# Patient Record
Sex: MALE
[De-identification: names, ages, dates, MRNs, and addresses within clinical notes are randomized per-mention and may not be internally consistent; named-entity substitution may affect disease eponyms.]

---

## 2024-02-09 ENCOUNTER — APPOINTMENT (OUTPATIENT)
Dept: ORTHOPEDIC SURGERY | Facility: CLINIC | Age: 39
End: 2024-02-09
Payer: COMMERCIAL

## 2024-02-09 VITALS — WEIGHT: 210 LBS | BODY MASS INDEX: 32.96 KG/M2 | HEIGHT: 67 IN

## 2024-02-09 DIAGNOSIS — Z78.9 OTHER SPECIFIED HEALTH STATUS: ICD-10-CM

## 2024-02-09 DIAGNOSIS — Z00.00 ENCOUNTER FOR GENERAL ADULT MEDICAL EXAMINATION W/OUT ABNORMAL FINDINGS: ICD-10-CM

## 2024-02-09 PROCEDURE — 73030 X-RAY EXAM OF SHOULDER: CPT | Mod: RT

## 2024-02-09 PROCEDURE — 99204 OFFICE O/P NEW MOD 45 MIN: CPT

## 2024-02-14 ENCOUNTER — APPOINTMENT (OUTPATIENT)
Dept: MRI IMAGING | Facility: CLINIC | Age: 39
End: 2024-02-14
Payer: COMMERCIAL

## 2024-02-14 PROCEDURE — 73221 MRI JOINT UPR EXTREM W/O DYE: CPT | Mod: RT

## 2024-02-23 ENCOUNTER — APPOINTMENT (OUTPATIENT)
Dept: ORTHOPEDIC SURGERY | Facility: CLINIC | Age: 39
End: 2024-02-23
Payer: COMMERCIAL

## 2024-02-23 DIAGNOSIS — S43.431A SUPERIOR GLENOID LABRUM LESION OF RIGHT SHOULDER, INITIAL ENCOUNTER: ICD-10-CM

## 2024-02-23 DIAGNOSIS — M75.21 BICIPITAL TENDINITIS, RIGHT SHOULDER: ICD-10-CM

## 2024-02-23 DIAGNOSIS — S46.011A STRAIN OF MUSCLE(S) AND TENDON(S) OF THE ROTATOR CUFF OF RIGHT SHOULDER, INITIAL ENCOUNTER: ICD-10-CM

## 2024-02-23 PROCEDURE — L3660: CPT

## 2024-02-23 PROCEDURE — 99214 OFFICE O/P EST MOD 30 MIN: CPT | Mod: 57

## 2024-02-23 NOTE — DATA REVIEWED
[MRI] : MRI [Right] : of the right [Report was reviewed and noted in the chart] : The report was reviewed and noted in the chart [Shoulder] : shoulder [I independently reviewed and interpreted images and report] : I independently reviewed and interpreted images and report [I reviewed the films/CD] : I reviewed the films/CD [FreeTextEntry1] : [facility] 2/14/24:  1. Mild to mod partial tearing with delamination at the distal supraspinatus tendon insertion measuring 8-9 mm with mild to mod surrounding bursisits and mild adjacent subcortical marrow edema in the greater tuberosity w/o retraction or atrophy 2. Tearing at the junction of the superior and anterior labrum with mild surrounding effusion and synovitis 3. Infraspinatus tendinopathy with mild surrounding bursitis 4. Mild diffuse biceps tenosynovitis 5. Moderate AC joint arthrosis and lateral acromial bone spurs with narrowing of the supraspinatus outlet 6. No acute osseous injury or disproportionate muscle atrophy

## 2024-02-23 NOTE — DISCUSSION/SUMMARY
[de-identified] : Plan: Patient understands that He is a candidate for right shoulder arthroscopy, RCR/labral repair, SAD, poss BT, DCE. Discussed r/b/a. Discussed non-operative and operative treatment options. All questions and concerns regarding the surgery were addressed. Went over the recovery timeline and expected outcomes following surgery. Patient elected to move forward with the surgical procedure.  Tests Ordered: Preop Prescription Medications Ordered: none Braces/DME Ordered: PAD sling Activity/Work/Sports Status: working Additional Instructions: ~  ~  Follow-Up: 2 weeks post-op   The patient's current medication management of their orthopedic diagnosis was reviewed today: (1) We discussed a comprehensive treatment plan that included possible pharmaceutical management involving the use of prescription strength medications including but not limited to options such as oral Naprosyn 500mg BID, once daily Meloxicam 15 mg, or 500-650 mg Tylenol versus over the counter oral medications and topical prescription NSAID Pennsaid vs over the counter Voltaren gel.  (2) There is a moderate risk of morbidity with further treatment, especially from use of prescription strength medications and possible side effects of these medications which include upset stomach with oral medications, skin reactions to topical medications and cardiac/renal issues with long term use.  (3) I recommended that the patient follow-up with their medical physician to discuss any significant specific potential issues with long term medication use such as interactions with current medications or with exacerbation of underlying medical comorbidities.  (4) The benefits and risks associated with use of injectable, oral or topical, prescription and over the counter anti-inflammatory medications were discussed with the patient. The patient voiced understanding of the risks including but not limited to bleeding, stroke, kidney dysfunction, heart disease, and were referred to the black box warning label for further information.   Consent:  Conservative treatment, nontreatment, nonsurgical intervention and surgical intervention treatment options have been reviewed with the patient.  The patient continues to be symptomatic and has failed conservative treatment, and elects to move forward with surgical intervention.  The patient is indicated for right shoulder arthroscopic rotator cuff repair, biceps tenotomy vs tenodesis, subacromial decompression, acromioplasty (, distal clavicle excision) and all indicated procedures. As such the alternatives, benefits and risks, of the above procedure, including but not limited to bleeding, infection, neurovascular injury, loss of limb, loss of life,  DVT, PE, RSD, inability to return to previous level of activity, inability to return to previous level of employment, advancement of or to osteoarthritic changes, joint instability or motion loss, hardware failure or migration, (biceps pain cramping weakness or deformity,) failure to resolve all symptoms, failure to return to sports and need for further procedures, as well as specific risk of need for future joint arthroplasty were discussed with the patient and/or their legal guardian who agreed to move forward with surgical intervention.  They have reviewed and signed the consent form today after expressing understanding of the above documented conversation. The patient or their representative will contact my office as instructed on the preoperative instruction sheet they received today to schedule surgery in a timely manner as discussed.

## 2024-02-23 NOTE — REASON FOR VISIT
[Family Member] : family member [FreeTextEntry2] : follow up: right shoulder MRI review NF DOI 2/25/2023

## 2024-02-23 NOTE — IMAGING
[de-identified] : The patient is a well appearing 38 year year old male of their stated age. Neck is supple & nontender to palpation. Negative Spurling's test.   General: in no acute distress, seated comfortably, moving easily Skin: No discoloration, rashes; on palpation skin is dry, Neuro: Normal sensation all dermatomes, motor all myotomes Vascular: Normal pulses, no edema, normal temperature Coordination and balance: Normal Psych: normal mood and affect, non pressured speech, alert and oriented x3   RIGHT Shoulder  Inspection: Scapula Winging: Negative Deformity: None Erythema: None Ecchymosis: None Abrasions: None Effusion: None   Range of Motion: Active Forward Flexion: 160 degrees Passive Forward Flexion: 170 degrees Active IR : L4 Passive ER : 30 degrees   Motor Exam: Forward Flexion: 4+ out of 5 Flexion Plane of Scapula: 5 out of 5 Abduction: 4+ out of 5 Internal Rotation: 5 out of 5 External Rotation: 4+ out of 5 Distal Motor Strength: 5 out of 5   Stability Testing: Anterior: 1+ Posterior: 1+ Sulcus N: 1+ Sulcus ER: 1+   Provocative Tests: Drop Arm: Negative Pardo/Impingement: Positive [place]: Positive X-Arm Adduction: Negative Belly Press: Negative Bear Hug: Negative Lift Off: Negative Apprehension: Negative Relocation: Negative Posterior Load & Shift: Negative   Palpation: AC Joint: TTP Clavicle: Nontender SC Joint: Nontender Bicipital Groove: TTP Coracoid Process: Nontender Pectoralis Minor Tendon: Nontender Pectoralis Major Tendon: Nontender & palpably intact Latissimus Dorsi: Nontender Proximal Humerus: Nontender Scapula Body: Nontender Medial Scapula Border: Nontender Scapula Spine: Nontender   Neurologic Exam:  Sensation to Light Touch Axillary: Grossly intact Ulnar: Grossly intact Radial: Grossly intact Median: Grossly intact   Circulatory/Pulses: Ulnar: 2+ Radial: 2+ CR < 2s  X-RAYS of the RIGHT shoulder (AP, SCAPULAR Y, AND AXILLARY VIEWS): no fracture or dislocation; Bigliani Type 2 acromion; mild degenerative change at ACJ; subacromial spurring

## 2024-02-23 NOTE — HISTORY OF PRESENT ILLNESS
[de-identified] : 02/23/2024   LIANG is presenting today for followup. Pain and symptoms are similar to the previous visit. He denies any numbness/tingling/fevers/chills. He underwent an MRI since last visit, and is here to discuss the imaging results.

## 2024-02-23 NOTE — IMAGING
[de-identified] : The patient is a well appearing 38 year year old male of their stated age. Neck is supple & nontender to palpation. Negative Spurling's test.   General: in no acute distress, seated comfortably, moving easily Skin: No discoloration, rashes; on palpation skin is dry, Neuro: Normal sensation all dermatomes, motor all myotomes Vascular: Normal pulses, no edema, normal temperature Coordination and balance: Normal Psych: normal mood and affect, non pressured speech, alert and oriented x3   RIGHT Shoulder  Inspection: Scapula Winging: Negative Deformity: None Erythema: None Ecchymosis: None Abrasions: None Effusion: None   Range of Motion: Active Forward Flexion: 160 degrees Passive Forward Flexion: 170 degrees Active IR : L4 Passive ER : 30 degrees   Motor Exam: Forward Flexion: 4+ out of 5 Flexion Plane of Scapula: 5 out of 5 Abduction: 4+ out of 5 Internal Rotation: 5 out of 5 External Rotation: 4+ out of 5 Distal Motor Strength: 5 out of 5   Stability Testing: Anterior: 1+ Posterior: 1+ Sulcus N: 1+ Sulcus ER: 1+   Provocative Tests: Drop Arm: Negative Pardo/Impingement: Positive Robertson: Positive X-Arm Adduction: Negative Belly Press: Negative Bear Hug: Negative Lift Off: Negative Apprehension: Negative Relocation: Negative Posterior Load & Shift: Negative   Palpation: AC Joint: TTP Clavicle: Nontender SC Joint: Nontender Bicipital Groove: TTP Coracoid Process: Nontender Pectoralis Minor Tendon: Nontender Pectoralis Major Tendon: Nontender & palpably intact Latissimus Dorsi: Nontender Proximal Humerus: Nontender Scapula Body: Nontender Medial Scapula Border: Nontender Scapula Spine: Nontender   Neurologic Exam:  Sensation to Light Touch Axillary: Grossly intact Ulnar: Grossly intact Radial: Grossly intact Median: Grossly intact   Circulatory/Pulses: Ulnar: 2+ Radial: 2+ CR < 2s   X-RAYS of the RIGHT shoulder (AP, SCAPULAR Y, AND AXILLARY VIEWS): no fracture or dislocation; Bigliani Type 2 acromion; mild degenerative change at ACJ; subacromial spurring
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
## 2024-02-23 NOTE — DISCUSSION/SUMMARY
[de-identified] : Assessment: The patient is a 38 year old male with right shoulder pain and physical exam findings consistent with POSSIBLE LABRAL TEAR OF RIGHT SHOULDER.  Patient and I discussed their symptoms. Discussed findings of today's exam and possible causes of patient's pain. Educated patient on their most probable diagnosis. Reviewed possible courses of treatment, and we collaboratively decided best course of treatment at this time will include:  1. MRI of right shoulder to eval for labral tear   The patient's current medication management of their orthopedic diagnosis was reviewed today:   (1) We discussed a comprehensive treatment plan that included possible pharmaceutical management involving the use of prescription strength medications including but not limited to options such as oral Naprosyn 500mg BID, once daily Meloxicam 15 mg, or 500-650 mg Tylenol versus over the counter oral medications and topical prescription NSAID Pennsaid vs over the counter Voltaren gel.   (2) There is a moderate risk of morbidity with further treatment, especially from use of prescription strength medications and possible side effects of these medications which include upset stomach with oral medications, skin reactions to topical medications and cardiac/renal issues with long term use.   (3) I recommended that the patient follow-up with their medical physician to discuss any significant specific potential issues with long term medication use such as interactions with current medications or with exacerbation of underlying medical comorbidities.   (4) The benefits and risks associated with use of injectable, oral or topical, prescription and over the counter anti-inflammatory medications were discussed with the patient. The patient voiced understanding of the risks including but not limited to bleeding, stroke, kidney dysfunction, heart disease, and were referred to the black box warning label for further information.  Follow up after MRI.

## 2024-03-07 ENCOUNTER — APPOINTMENT (OUTPATIENT)
Age: 39
End: 2024-03-07
Payer: COMMERCIAL

## 2024-03-07 PROCEDURE — 29823 SHO ARTHRS SRG XTNSV DBRDMT: CPT | Mod: 59,RT

## 2024-03-07 PROCEDURE — 29827 SHO ARTHRS SRG RT8TR CUF RPR: CPT | Mod: AS,RT

## 2024-03-07 PROCEDURE — 29823 SHO ARTHRS SRG XTNSV DBRDMT: CPT | Mod: AS,59,RT

## 2024-03-07 PROCEDURE — 29826 SHO ARTHRS SRG DECOMPRESSION: CPT | Mod: RT

## 2024-03-07 PROCEDURE — 29826 SHO ARTHRS SRG DECOMPRESSION: CPT | Mod: AS,RT

## 2024-03-07 PROCEDURE — 29827 SHO ARTHRS SRG RT8TR CUF RPR: CPT | Mod: RT

## 2024-03-07 PROCEDURE — 29807 SHO ARTHRS SRG RPR SLAP LES: CPT | Mod: AS,59,RT

## 2024-03-07 PROCEDURE — 29807 SHO ARTHRS SRG RPR SLAP LES: CPT | Mod: 59,RT

## 2024-03-07 RX ORDER — OXYCODONE 5 MG/1
5 TABLET ORAL
Qty: 30 | Refills: 0 | Status: ACTIVE | COMMUNITY
Start: 2024-03-07 | End: 1900-01-01

## 2024-03-07 RX ORDER — ONDANSETRON 4 MG/1
4 TABLET ORAL EVERY 8 HOURS
Qty: 20 | Refills: 0 | Status: ACTIVE | COMMUNITY
Start: 2024-03-07 | End: 1900-01-01

## 2024-03-07 RX ORDER — ACETAMINOPHEN 500 MG/1
500 TABLET ORAL 3 TIMES DAILY
Qty: 60 | Refills: 0 | Status: ACTIVE | COMMUNITY
Start: 2024-03-07 | End: 1900-01-01

## 2024-03-07 RX ORDER — IBUPROFEN 600 MG/1
600 TABLET, FILM COATED ORAL 3 TIMES DAILY
Qty: 90 | Refills: 0 | Status: ACTIVE | COMMUNITY
Start: 2024-03-07 | End: 1900-01-01

## 2024-03-22 ENCOUNTER — APPOINTMENT (OUTPATIENT)
Dept: ORTHOPEDIC SURGERY | Facility: CLINIC | Age: 39
End: 2024-03-22
Payer: COMMERCIAL

## 2024-03-22 DIAGNOSIS — F17.200 NICOTINE DEPENDENCE, UNSPECIFIED, UNCOMPLICATED: ICD-10-CM

## 2024-03-22 PROCEDURE — 99024 POSTOP FOLLOW-UP VISIT: CPT

## 2024-03-22 PROCEDURE — 73030 X-RAY EXAM OF SHOULDER: CPT | Mod: RT

## 2024-03-22 NOTE — IMAGING
[de-identified] : The patient is a well appearing 38 year year old male of their stated age. Neck is supple & nontender to palpation. Negative Spurling's test.   General: in no acute distress, seated comfortably, moving easily Skin: No discoloration, rashes; on palpation skin is dry, Neuro: Normal sensation all dermatomes, motor all myotomes Vascular: Normal pulses, no edema, normal temperature Coordination and balance: Normal Psych: normal mood and affect, non pressured speech, alert and oriented x3   RIGHT Shoulder  Inspection: Scapula Winging: Negative Deformity: None Erythema: None Ecchymosis: None Abrasions: None Effusion: None   Range of Motion: Active Forward Flexion: 160 degrees Passive Forward Flexion: 170 degrees Active IR : L4 Passive ER : 30 degrees   Motor Exam: Forward Flexion: 4+ out of 5 Flexion Plane of Scapula: 5 out of 5 Abduction: 4+ out of 5 Internal Rotation: 5 out of 5 External Rotation: 4+ out of 5 Distal Motor Strength: 5 out of 5   Stability Testing: Anterior: 1+ Posterior: 1+ Sulcus N: 1+ Sulcus ER: 1+   Provocative Tests: Drop Arm: Negative Pardo/Impingement: Positive [place]: Positive X-Arm Adduction: Negative Belly Press: Negative Bear Hug: Negative Lift Off: Negative Apprehension: Negative Relocation: Negative Posterior Load & Shift: Negative   Palpation: AC Joint: TTP Clavicle: Nontender SC Joint: Nontender Bicipital Groove: TTP Coracoid Process: Nontender Pectoralis Minor Tendon: Nontender Pectoralis Major Tendon: Nontender & palpably intact Latissimus Dorsi: Nontender Proximal Humerus: Nontender Scapula Body: Nontender Medial Scapula Border: Nontender Scapula Spine: Nontender   Neurologic Exam:  Sensation to Light Touch Axillary: Grossly intact Ulnar: Grossly intact Radial: Grossly intact Median: Grossly intact   Circulatory/Pulses: Ulnar: 2+ Radial: 2+ CR < 2s  X-RAYS of the RIGHT shoulder (AP, SCAPULAR Y, AND AXILLARY VIEWS): no fracture or dislocation; Bigliani Type 2 acromion; mild degenerative change at ACJ; subacromial spurring

## 2024-03-22 NOTE — DISCUSSION/SUMMARY
[de-identified] : Assessment & Plan: The patient is approximately 2 weeks postoperative. Sutures removed and Steri Strips applied today. The patient is instructed in wound management. The patient's post-op plan, protocol and activity modifications have been thoroughly discussed and the patient expressed understanding. The patient will control pain as discussed & continue ice and elevation as needed. The patient otherwise may advance activity as discussed.   Arthroscopy photos were reviewed in great detail.   Prescription Medications Ordered: [None]   Physical Therapy: [Start per protocol, new prescription given today, continue home exercise program]   Braces/DME Ordered: [Continue Postop Brace]   Activity/Work/Sports Status: [Out of work/gym/sports]   Follow-Up: [4 weeks]

## 2024-03-22 NOTE — PHYSICAL EXAM
[de-identified] : No erythema, no discharge, no increased warmth to touch. Sutures intact. ROM consistent with immobility due to brace, strength testing deferred due to post-op status. Distally neurovascularly intact with median, radial, ulnar, MSC, axillary nerves intact. 2+ radial pulse with capillary refill >2 seconds  X-rays of the RIGHT shoulder show no fracture or dislocation; no hardware related complication; appropriate postoperative appearance of the glenoid and acromion

## 2024-03-22 NOTE — HISTORY OF PRESENT ILLNESS
[de-identified] : 02/23/2024   LIANG is presenting today for followup. Pain and symptoms are similar to the previous visit. He denies any numbness/tingling/fevers/chills. He underwent an MRI since last visit, and is here to discuss the imaging results.   03/22/2024: Pt here for PO#1- R. Shoulder on 3/7/24. States he is doing better everyday. Pain only with movement.

## 2024-05-03 ENCOUNTER — APPOINTMENT (OUTPATIENT)
Dept: ORTHOPEDIC SURGERY | Facility: CLINIC | Age: 39
End: 2024-05-03
Payer: COMMERCIAL

## 2024-05-03 VITALS — WEIGHT: 210 LBS | HEIGHT: 67 IN | BODY MASS INDEX: 32.96 KG/M2

## 2024-05-03 DIAGNOSIS — M75.41 IMPINGEMENT SYNDROME OF RIGHT SHOULDER: ICD-10-CM

## 2024-05-03 PROCEDURE — 99024 POSTOP FOLLOW-UP VISIT: CPT

## 2024-05-09 NOTE — DISCUSSION/SUMMARY
[de-identified] : Assessment & Plan: The patient is approximately 6 weeks postoperative. Incision(s) appear to be healing well. The patient is instructed in wound management. The patient's post-op plan, protocol and activity modifications have been thoroughly discussed and the patient expressed understanding. The patient will control pain as discussed & continue ice and elevation as needed. The patient otherwise may advance activity as discussed.   Prescription Medications Ordered: [None]   Physical Therapy: [Continue per protocol, new prescription given today, continue home exercise program]   Braces/DME Ordered: [No brace needed any longer]   Activity/Work/Sports Status: [Continue out of work/gym/sports]   Follow-Up: [6 weeks]

## 2024-05-09 NOTE — PHYSICAL EXAM
[de-identified] : No erythema, no discharge, no increased warmth to touch. Incisions intact. /45/L4, strength testing 5/5. Distally neurovascularly intact with median, radial, ulnar, MSC, axillary nerves intact. 2+ radial pulse with capillary refill >2 seconds

## 2024-06-28 ENCOUNTER — APPOINTMENT (OUTPATIENT)
Dept: ORTHOPEDIC SURGERY | Facility: CLINIC | Age: 39
End: 2024-06-28

## 2024-06-28 DIAGNOSIS — Z98.890 OTHER SPECIFIED POSTPROCEDURAL STATES: ICD-10-CM

## 2024-06-28 PROCEDURE — 99214 OFFICE O/P EST MOD 30 MIN: CPT

## 2024-09-27 ENCOUNTER — APPOINTMENT (OUTPATIENT)
Dept: ORTHOPEDIC SURGERY | Facility: CLINIC | Age: 39
End: 2024-09-27
Payer: COMMERCIAL

## 2024-09-27 VITALS — HEIGHT: 67 IN | BODY MASS INDEX: 32.96 KG/M2 | WEIGHT: 210 LBS

## 2024-09-27 DIAGNOSIS — S43.431A SUPERIOR GLENOID LABRUM LESION OF RIGHT SHOULDER, INITIAL ENCOUNTER: ICD-10-CM

## 2024-09-27 DIAGNOSIS — M75.41 IMPINGEMENT SYNDROME OF RIGHT SHOULDER: ICD-10-CM

## 2024-09-27 DIAGNOSIS — Z98.890 OTHER SPECIFIED POSTPROCEDURAL STATES: ICD-10-CM

## 2024-09-27 PROCEDURE — 99213 OFFICE O/P EST LOW 20 MIN: CPT

## 2024-10-04 NOTE — REASON FOR VISIT
[Family Member] : family member [FreeTextEntry2] : LIANG is here today for a followup visit for right shoulder. NF DOI 2/25/2023

## 2024-10-04 NOTE — DISCUSSION/SUMMARY
[de-identified] : Assessment & Plan: The patient is approximately 6 months postoperative. Incision(s) healed. The patient's post-op plan, protocol and activity modifications have been thoroughly discussed and the patient expressed understanding. The patient will control pain as discussed & continue ice and elevation as needed. The patient otherwise may advance activity as discussed.  Doing well.   Prescription Medications Ordered: [None]   Physical Therapy: [continue home exercise program]   Braces/DME Ordered: [No brace needed any longer]   Activity/Work/Sports Status: Working, progressing well without symptoms   Follow-Up: as needed

## 2024-10-04 NOTE — HISTORY OF PRESENT ILLNESS
[de-identified] : 02/23/2024   LIANG is presenting today for followup. Pain and symptoms are similar to the previous visit. He denies any numbness/tingling/fevers/chills. He underwent an MRI since last visit, and is here to discuss the imaging results.   03/22/2024: Pt here for PO#1- R. Shoulder on 3/7/24. States he is doing better everyday. Pain only with movement.   5/3/24: Patient here for POV2. Doing well and attending PT 2x/weekly. Reports occasional pain in shoulder in the morning   06/28/2024: POV3. Patient d/c PT about one month ago, shoulder feels well, reports no pain. ROM improving.   9/27/24: F/U RT SHOULDER, has been working. Still some aching with certain motions.

## 2024-10-04 NOTE — DISCUSSION/SUMMARY
[de-identified] : Assessment & Plan: The patient is approximately 6 months postoperative. Incision(s) healed. The patient's post-op plan, protocol and activity modifications have been thoroughly discussed and the patient expressed understanding. The patient will control pain as discussed & continue ice and elevation as needed. The patient otherwise may advance activity as discussed.  Doing well.   Prescription Medications Ordered: [None]   Physical Therapy: [continue home exercise program]   Braces/DME Ordered: [No brace needed any longer]   Activity/Work/Sports Status: Working, progressing well without symptoms   Follow-Up: as needed

## 2024-10-04 NOTE — PHYSICAL EXAM
[de-identified] : No erythema, no discharge, no increased warmth to touch. Incisions healed. /45/L4, strength testing 5/5. Negative Miller's. Distally neurovascularly intact with median, radial, ulnar, MSC, axillary nerves intact. 2+ radial pulse with capillary refill >2 seconds

## 2024-10-04 NOTE — PHYSICAL EXAM
[de-identified] : No erythema, no discharge, no increased warmth to touch. Incisions healed. /45/L4, strength testing 5/5. Negative Miller's. Distally neurovascularly intact with median, radial, ulnar, MSC, axillary nerves intact. 2+ radial pulse with capillary refill >2 seconds

## 2024-10-04 NOTE — HISTORY OF PRESENT ILLNESS
[de-identified] : 02/23/2024   LIANG is presenting today for followup. Pain and symptoms are similar to the previous visit. He denies any numbness/tingling/fevers/chills. He underwent an MRI since last visit, and is here to discuss the imaging results.   03/22/2024: Pt here for PO#1- R. Shoulder on 3/7/24. States he is doing better everyday. Pain only with movement.   5/3/24: Patient here for POV2. Doing well and attending PT 2x/weekly. Reports occasional pain in shoulder in the morning   06/28/2024: POV3. Patient d/c PT about one month ago, shoulder feels well, reports no pain. ROM improving.   9/27/24: F/U RT SHOULDER, has been working. Still some aching with certain motions.

## 2024-10-25 ENCOUNTER — APPOINTMENT (OUTPATIENT)
Dept: ORTHOPEDIC SURGERY | Facility: CLINIC | Age: 39
End: 2024-10-25
